# Patient Record
Sex: MALE | Race: WHITE | NOT HISPANIC OR LATINO | Employment: OTHER | URBAN - METROPOLITAN AREA
[De-identification: names, ages, dates, MRNs, and addresses within clinical notes are randomized per-mention and may not be internally consistent; named-entity substitution may affect disease eponyms.]

---

## 2017-08-01 ENCOUNTER — ALLSCRIPTS OFFICE VISIT (OUTPATIENT)
Dept: OTHER | Facility: OTHER | Age: 67
End: 2017-08-01

## 2017-08-01 DIAGNOSIS — E29.1 TESTICULAR HYPOFUNCTION: ICD-10-CM

## 2017-08-01 LAB
BILIRUB UR QL STRIP: NEGATIVE
CLARITY UR: NORMAL
COLOR UR: YELLOW
GLUCOSE (HISTORICAL): 100
HGB UR QL STRIP.AUTO: NEGATIVE
KETONES UR STRIP-MCNC: NEGATIVE MG/DL
LEUKOCYTE ESTERASE UR QL STRIP: NEGATIVE
NITRITE UR QL STRIP: NEGATIVE
PH UR STRIP.AUTO: 6 [PH]
PROT UR STRIP-MCNC: NEGATIVE MG/DL
SP GR UR STRIP.AUTO: 1.01
UROBILINOGEN UR QL STRIP.AUTO: 0.2

## 2017-08-09 ENCOUNTER — GENERIC CONVERSION - ENCOUNTER (OUTPATIENT)
Dept: OTHER | Facility: OTHER | Age: 67
End: 2017-08-09

## 2017-09-13 ENCOUNTER — APPOINTMENT (OUTPATIENT)
Dept: RADIOLOGY | Age: 67
End: 2017-09-13
Attending: PREVENTIVE MEDICINE
Payer: OTHER MISCELLANEOUS

## 2017-09-13 ENCOUNTER — TRANSCRIBE ORDERS (OUTPATIENT)
Dept: RADIOLOGY | Age: 67
End: 2017-09-13

## 2017-09-13 ENCOUNTER — APPOINTMENT (OUTPATIENT)
Dept: URGENT CARE | Age: 67
End: 2017-09-13
Payer: OTHER MISCELLANEOUS

## 2017-09-13 DIAGNOSIS — R07.81 RIB PAIN ON LEFT SIDE: ICD-10-CM

## 2017-09-13 DIAGNOSIS — M25.512 ACUTE PAIN OF LEFT SHOULDER: ICD-10-CM

## 2017-09-13 DIAGNOSIS — M25.512 ACUTE PAIN OF LEFT SHOULDER: Primary | ICD-10-CM

## 2017-09-13 PROCEDURE — 71101 X-RAY EXAM UNILAT RIBS/CHEST: CPT

## 2017-09-13 PROCEDURE — 73030 X-RAY EXAM OF SHOULDER: CPT

## 2017-09-13 PROCEDURE — 99283 EMERGENCY DEPT VISIT LOW MDM: CPT | Performed by: PREVENTIVE MEDICINE

## 2017-09-13 PROCEDURE — G0382 LEV 3 HOSP TYPE B ED VISIT: HCPCS | Performed by: PREVENTIVE MEDICINE

## 2017-09-16 ENCOUNTER — APPOINTMENT (OUTPATIENT)
Dept: URGENT CARE | Age: 67
End: 2017-09-16
Payer: OTHER MISCELLANEOUS

## 2017-09-16 PROCEDURE — 99213 OFFICE O/P EST LOW 20 MIN: CPT | Performed by: PREVENTIVE MEDICINE

## 2017-09-20 ENCOUNTER — APPOINTMENT (OUTPATIENT)
Dept: URGENT CARE | Age: 67
End: 2017-09-20
Payer: OTHER MISCELLANEOUS

## 2017-09-20 PROCEDURE — 99213 OFFICE O/P EST LOW 20 MIN: CPT | Performed by: PREVENTIVE MEDICINE

## 2017-09-27 ENCOUNTER — APPOINTMENT (OUTPATIENT)
Dept: RADIOLOGY | Age: 67
End: 2017-09-27
Payer: OTHER MISCELLANEOUS

## 2017-09-27 ENCOUNTER — TRANSCRIBE ORDERS (OUTPATIENT)
Dept: URGENT CARE | Age: 67
End: 2017-09-27

## 2017-09-27 ENCOUNTER — APPOINTMENT (OUTPATIENT)
Dept: URGENT CARE | Age: 67
End: 2017-09-27
Payer: OTHER MISCELLANEOUS

## 2017-09-27 DIAGNOSIS — T14.90XA INJURY: ICD-10-CM

## 2017-09-27 DIAGNOSIS — T14.90XA INJURY: Primary | ICD-10-CM

## 2017-09-27 PROCEDURE — 99213 OFFICE O/P EST LOW 20 MIN: CPT | Performed by: PREVENTIVE MEDICINE

## 2017-09-27 PROCEDURE — 73060 X-RAY EXAM OF HUMERUS: CPT

## 2018-01-12 VITALS
HEIGHT: 68 IN | WEIGHT: 276 LBS | SYSTOLIC BLOOD PRESSURE: 122 MMHG | DIASTOLIC BLOOD PRESSURE: 86 MMHG | BODY MASS INDEX: 41.83 KG/M2

## 2018-01-12 NOTE — MISCELLANEOUS
Message   Recorded as Task   Date: 08/09/2017 10:38 AM, Created By: Leyda Avery   Task Name: Call Back   Assigned To: Obie PEREIRA,TEAM   Regarding Patient: Brandy Bland, Status: In Progress   Comment:    Jennifer Mcmullen - 09 Aug 2017 10:38 AM     TASK CREATED  Caller: Self; Results Inquiry; [phone] (Home)  patient called for his lab results please call him back   Ce Hook - 09 Aug 2017 11:42 AM     TASK EDITED  [facility] for pt to call with name of lab to obtain results  Ce Hook - 09 Aug 2017 11:42 AM     TASK IN PROGRESS   Jacqueline Gupta - 09 Aug 2017 1:11 PM     TASK EDITED  Pt went to [facility] in [facility] 8/6/17 and had testostrone done  [address] Aug 2017 3:08 PM     TASK EDITED  Per Dr Andrews Most PSA normal range  Testosterone Panel also in normal range  ED due to other process  Pt  will make appt if he wants to discuss treatment  Active Problems    1  BPH without urinary obstruction (600 00) (N40 0)   2  Male erectile disorder (607 84) (N52 9)   3  Testicular hypogonadism (257 2) (E29 1)    Current Meds   1  Aspirin 81 MG TABS; Therapy: (Recorded:01Aug2017) to Recorded   2  Benazepril HCl - 5 MG Oral Tablet; Therapy: (Recorded:01Aug2017) to Recorded   3  Coreg 6 25 MG Oral Tablet (Carvedilol); Therapy: (Recorded:01Aug2017) to Recorded   4  Daily Multivitamin TABS; Therapy: (Recorded:01Aug2017) to Recorded   5  HumaLOG KwikPen 100 UNIT/ML Subcutaneous Solution Pen-injector; Therapy: (Recorded:01Aug2017) to Recorded   6  RA Loratadine 10 MG Oral Tablet; Therapy: (Recorded:01Aug2017) to Recorded   7  Requip 0 5 MG Oral Tablet (ROPINIRole HCl); Therapy: (Recorded:01Aug2017) to Recorded   8  Requip 2 MG Oral Tablet (ROPINIRole HCl); Therapy: (Recorded:01Aug2017) to Recorded   9  Sildenafil Citrate 20 MG Oral Tablet; TAKE 5 TABLET Other PRN erectile dysfuncition; Therapy: [id number] to (Evaluate:02Sep2017);  Last Rx:[id number] Ordered   10  Singulair 10 MG Oral Tablet (Montelukast Sodium); Therapy: (Recorded:41Vtc5565) to Recorded   11  Toujeo SoloStar 300 UNIT/ML Subcutaneous Solution Pen-injector; Therapy: (Recorded:95Pin6866) to Recorded    Allergies    1  Adhesive Tape TAPE   2  MetFORMIN HCl TABS   3  Silvadene CREA   4   Surgical TAPE    Signatures   Electronically signed by : Catrachita Montoya RN; Aug  9 2017  3:08PM EST                       (Author)

## 2018-03-06 DIAGNOSIS — N52.8 OTHER MALE ERECTILE DYSFUNCTION: Primary | ICD-10-CM

## 2018-03-06 RX ORDER — SILDENAFIL CITRATE 20 MG/1
TABLET ORAL
Qty: 90 TABLET | Refills: 1 | Status: SHIPPED | OUTPATIENT
Start: 2018-03-06

## 2018-03-06 NOTE — TELEPHONE ENCOUNTER
Patient called requesting refill on Sildenafil 20mg    Request for same, 90 count stock bottle with 1 refill was generated and forwarded to Dr Rosa Tolentino for approval

## 2018-07-31 ENCOUNTER — APPOINTMENT (OUTPATIENT)
Dept: LAB | Age: 68
End: 2018-07-31
Payer: MEDICARE

## 2018-07-31 DIAGNOSIS — E29.1 TESTICULAR HYPOFUNCTION: ICD-10-CM

## 2018-07-31 DIAGNOSIS — N40.0 ENLARGED PROSTATE WITHOUT LOWER URINARY TRACT SYMPTOMS (LUTS): ICD-10-CM

## 2018-07-31 LAB
LH SERPL-ACNC: 6.9 MIU/ML (ref 1.2–10.6)
PSA SERPL-MCNC: 1.4 NG/ML (ref 0–4)

## 2018-07-31 PROCEDURE — 84153 ASSAY OF PSA TOTAL: CPT

## 2018-07-31 PROCEDURE — 83002 ASSAY OF GONADOTROPIN (LH): CPT

## 2018-07-31 PROCEDURE — 84270 ASSAY OF SEX HORMONE GLOBUL: CPT

## 2018-07-31 PROCEDURE — 36415 COLL VENOUS BLD VENIPUNCTURE: CPT

## 2018-08-01 DIAGNOSIS — N40.0 ENLARGED PROSTATE WITHOUT LOWER URINARY TRACT SYMPTOMS (LUTS): ICD-10-CM

## 2018-08-01 LAB — SHBG SERPL-SCNC: 41.5 NMOL/L (ref 19.3–76.4)

## 2018-08-06 ENCOUNTER — OFFICE VISIT (OUTPATIENT)
Dept: UROLOGY | Facility: MEDICAL CENTER | Age: 68
End: 2018-08-06
Payer: MEDICARE

## 2018-08-06 VITALS
BODY MASS INDEX: 40.16 KG/M2 | DIASTOLIC BLOOD PRESSURE: 68 MMHG | SYSTOLIC BLOOD PRESSURE: 124 MMHG | WEIGHT: 265 LBS | HEIGHT: 68 IN

## 2018-08-06 DIAGNOSIS — R35.1 BPH ASSOCIATED WITH NOCTURIA: Primary | ICD-10-CM

## 2018-08-06 DIAGNOSIS — N40.1 BPH ASSOCIATED WITH NOCTURIA: Primary | ICD-10-CM

## 2018-08-06 LAB
SL AMB  POCT GLUCOSE, UA: NORMAL
SL AMB LEUKOCYTE ESTERASE,UA: NEGATIVE
SL AMB POCT BILIRUBIN,UA: NEGATIVE
SL AMB POCT BLOOD,UA: NEGATIVE
SL AMB POCT CLARITY,UA: CLEAR
SL AMB POCT COLOR,UA: YELLOW
SL AMB POCT KETONES,UA: NEGATIVE
SL AMB POCT NITRITE,UA: NEGATIVE
SL AMB POCT PH,UA: 5.5
SL AMB POCT SPECIFIC GRAVITY,UA: 1.01
SL AMB POCT URINE PROTEIN: NEGATIVE
SL AMB POCT UROBILINOGEN: 0.2

## 2018-08-06 PROCEDURE — 81003 URINALYSIS AUTO W/O SCOPE: CPT | Performed by: UROLOGY

## 2018-08-06 PROCEDURE — 99214 OFFICE O/P EST MOD 30 MIN: CPT | Performed by: UROLOGY

## 2018-08-06 RX ORDER — FLUTICASONE PROPIONATE 50 MCG
1 SPRAY, SUSPENSION (ML) NASAL AS NEEDED
COMMUNITY

## 2018-08-06 RX ORDER — HYDRALAZINE HYDROCHLORIDE 25 MG/1
TABLET, FILM COATED ORAL 2 TIMES DAILY
COMMUNITY
Start: 2018-06-15

## 2018-08-06 RX ORDER — ROPINIROLE 2 MG/1
TABLET, FILM COATED ORAL AS NEEDED
COMMUNITY

## 2018-08-06 RX ORDER — MONTELUKAST SODIUM 10 MG/1
TABLET ORAL DAILY
COMMUNITY

## 2018-08-06 RX ORDER — CARVEDILOL 25 MG/1
TABLET ORAL 2 TIMES DAILY
COMMUNITY
Start: 2018-05-21

## 2018-08-06 RX ORDER — ROPINIROLE 0.5 MG/1
TABLET, FILM COATED ORAL AS NEEDED
COMMUNITY

## 2018-08-06 RX ORDER — AMLODIPINE BESYLATE 10 MG/1
TABLET ORAL DAILY
COMMUNITY
Start: 2018-06-15

## 2018-08-06 RX ORDER — INSULIN GLARGINE 300 U/ML
INJECTION, SOLUTION SUBCUTANEOUS DAILY
COMMUNITY
Start: 2018-06-14

## 2018-08-06 RX ORDER — ALBUTEROL SULFATE 90 UG/1
1 AEROSOL, METERED RESPIRATORY (INHALATION) AS NEEDED
COMMUNITY

## 2018-08-06 RX ORDER — PREDNISONE 10 MG/1
TABLET ORAL DAILY
COMMUNITY
Start: 2018-06-29

## 2018-08-06 RX ORDER — CEPHALEXIN 500 MG/1
CAPSULE ORAL 4 TIMES DAILY
COMMUNITY
Start: 2018-08-03

## 2018-08-06 RX ORDER — HYDROXYCHLOROQUINE SULFATE 200 MG/1
TABLET, FILM COATED ORAL 2 TIMES DAILY
COMMUNITY
Start: 2018-05-21

## 2018-08-06 RX ORDER — LORATADINE 10 MG/1
10 TABLET ORAL DAILY
COMMUNITY

## 2018-08-06 RX ORDER — PREDNISONE 1 MG/1
TABLET ORAL DAILY
COMMUNITY
Start: 2018-07-16

## 2018-08-06 NOTE — PROGRESS NOTES
Assessment/Plan:    BPH associated with nocturia   The patient has mild symptoms  PSA is normal   Return in 1 year  Diagnoses and all orders for this visit:    BPH associated with nocturia  -     POCT urine dip auto non-scope  -     PSA, Total Screen; Future    Other orders  -     albuterol (PROVENTIL HFA,VENTOLIN HFA) 90 mcg/act inhaler; Inhale 1 puff as needed  -     amLODIPine (NORVASC) 10 mg tablet; daily  -     aspirin 81 MG tablet; Take by mouth daily  -     carvedilol (COREG) 25 mg tablet; 2 (two) times a day  -     cephalexin (KEFLEX) 500 mg capsule; 4 (four) times a day  -     Multiple Vitamins-Minerals (DAILY MULTIVITAMIN PO); Take by mouth daily  -     fluticasone (FLONASE) 50 mcg/act nasal spray; 1 spray into each nostril as needed  -     insulin lispro (HUMALOG KWIKPEN) 100 units/mL injection pen; Inject under the skin daily  -     hydrALAZINE (APRESOLINE) 25 mg tablet; 2 (two) times a day  -     hydroxychloroquine (PLAQUENIL) 200 mg tablet; 2 (two) times a day  -     TOUJEO SOLOSTAR 300 units/mL CONCETRATED U-300 injection pen; daily  -     loratadine (CLARITIN) 10 mg tablet; Take 10 mg by mouth daily  -     predniSONE 5 mg tablet; daily  -     predniSONE 10 mg tablet; daily  -     rOPINIRole (REQUIP) 0 5 mg tablet; Take by mouth as needed  -     rOPINIRole (REQUIP) 2 mg tablet; Take by mouth as needed  -     montelukast (SINGULAIR) 10 mg tablet; Take by mouth daily          Subjective:      Patient ID: Geni Dodson is a 78 y o  male  HPI  BPH:  Pt has to overhydrate due to 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  He notes nocturia x 3  He denies other significant urinary symptoms  He denies gross hematuria, urinary tract infections or incontinence  He is taking neither medications nor supplements for his symptoms  PSA last week  was 1 4  Erectile dysfunction:   Doing better in the last few months  Started prednisone in early April  Still using sildenafil 20 mg, 5 at a time  The following portions of the patient's history were reviewed and updated as appropriate: allergies, current medications, past family history, past medical history, past social history, past surgical history and problem list     Review of Systems   Constitutional: Negative for activity change and fatigue  Newly diagnosed sarcoidosis  As to hydrate to keep serum calcium low enough  Respiratory: Negative for shortness of breath and wheezing  Cardiovascular: Negative for chest pain  Gastrointestinal: Negative for abdominal pain  Endocrine:        IDDM with good control  Needs prednisone for sarcoidosis  Genitourinary: Negative for difficulty urinating, dysuria, frequency, hematuria and urgency  Creatinine was 1 85 in July  Musculoskeletal: Negative for back pain and gait problem  Skin: Negative  Allergic/Immunologic: Negative  Neurological: Negative  Psychiatric/Behavioral: Negative  Objective:      /68 (BP Location: Left arm, Patient Position: Sitting, Cuff Size: Standard)   Ht 5' 8" (1 727 m)   Wt 120 kg (265 lb)   BMI 40 29 kg/m²          Physical Exam   Constitutional: He is oriented to person, place, and time  He appears well-developed and well-nourished  HENT:   Head: Normocephalic and atraumatic  Eyes: EOM are normal    Neck: Normal range of motion  Neck supple  Pulmonary/Chest: Effort normal    Genitourinary: Rectum normal    Genitourinary Comments: The prostate is 50 g, smooth, non-tender  Musculoskeletal: Normal range of motion  Neurological: He is alert and oriented to person, place, and time  Skin: Skin is warm and dry  Psychiatric: He has a normal mood and affect   His behavior is normal  Judgment and thought content normal

## 2018-08-06 NOTE — PROGRESS NOTES
IPSS Questionnaire (AUA-7): Over the past month    1)  How often have you had a sensation of not emptying your bladder completely after you finish urinating? 1 - Less than 1 time in 5   2)  How often have you had to urinate again less than two hours after you finished urinating? 3 - About half the time   3)  How often have you found you stopped and started again several times when you urinated? 0 - Not at all   4) How difficult have you found it to postpone urination? 2 - Less than half the time   5) How often have you had a weak urinary stream?  1 - Less than 1 time in 5   6) How often have you had to push or strain to begin urination? 0 - Not at all   7) How many times did you most typically get up to urinate from the time you went to bed until the time you got up in the morning? 3 - 3 times   Total Score:  10     QOL: Mixed

## 2018-08-06 NOTE — LETTER
August 6, 2018     Slade Burgos,   [address]  [address] [facility]    Patient: Gaurang Reyes   YOB: 1950   Date of Visit: 8/6/2018       Dear Dr Ashley Anglin: Thank you for referring Ellie Eleuterios to me for evaluation  Below are my notes for this consultation  If you have questions, please do not hesitate to call me  I look forward to following your patient along with you  Sincerely,        Zelalem Henriquez MD        CC: No Recipients  Zelalem Henriquez MD  8/6/2018 12:01 PM  Sign at close encounter  Assessment/Plan:    BPH associated with nocturia   The patient has mild symptoms  PSA is normal   Return in 1 year  Diagnoses and all orders for this visit:    BPH associated with nocturia  -     POCT urine dip auto non-scope  -     PSA, Total Screen; Future    Other orders  -     albuterol (PROVENTIL HFA,VENTOLIN HFA) 90 mcg/act inhaler; Inhale 1 puff as needed  -     amLODIPine (NORVASC) 10 mg tablet; daily  -     aspirin 81 MG tablet; Take by mouth daily  -     carvedilol (COREG) 25 mg tablet; 2 (two) times a day  -     cephalexin (KEFLEX) 500 mg capsule; 4 (four) times a day  -     Multiple Vitamins-Minerals (DAILY MULTIVITAMIN PO); Take by mouth daily  -     fluticasone (FLONASE) 50 mcg/act nasal spray; 1 spray into each nostril as needed  -     insulin lispro (HUMALOG KWIKPEN) 100 units/mL injection pen; Inject under the skin daily  -     hydrALAZINE (APRESOLINE) 25 mg tablet; 2 (two) times a day  -     hydroxychloroquine (PLAQUENIL) 200 mg tablet; 2 (two) times a day  -     TOUJEO SOLOSTAR 300 units/mL CONCETRATED U-300 injection pen; daily  -     loratadine (CLARITIN) 10 mg tablet; Take 10 mg by mouth daily  -     predniSONE 5 mg tablet; daily  -     predniSONE 10 mg tablet; daily  -     rOPINIRole (REQUIP) 0 5 mg tablet; Take by mouth as needed  -     rOPINIRole (REQUIP) 2 mg tablet;  Take by mouth as needed  -     montelukast (SINGULAIR) 10 mg tablet; Take by mouth daily          Subjective:      Patient ID: Agatha Sutton is a 78 y o  male  HPI  BPH:  Pt has to overhydrate due to 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  He notes nocturia x 3  He denies other significant urinary symptoms  He denies gross hematuria, urinary tract infections or incontinence  He is taking neither medications nor supplements for his symptoms  PSA last week  was 1 4  Erectile dysfunction:   Doing better in the last few months  Started prednisone in early April  Still using sildenafil 20 mg, 5 at a time  The following portions of the patient's history were reviewed and updated as appropriate: allergies, current medications, past family history, past medical history, past social history, past surgical history and problem list     Review of Systems   Constitutional: Negative for activity change and fatigue  Newly diagnosed sarcoidosis  As to hydrate to keep serum calcium low enough  Respiratory: Negative for shortness of breath and wheezing  Cardiovascular: Negative for chest pain  Gastrointestinal: Negative for abdominal pain  Endocrine:        IDDM with good control  Needs prednisone for sarcoidosis  Genitourinary: Negative for difficulty urinating, dysuria, frequency, hematuria and urgency  Creatinine was 1 85 in July  Musculoskeletal: Negative for back pain and gait problem  Skin: Negative  Allergic/Immunologic: Negative  Neurological: Negative  Psychiatric/Behavioral: Negative  Objective:      /68 (BP Location: Left arm, Patient Position: Sitting, Cuff Size: Standard)   Ht 5' 8" (1 727 m)   Wt 120 kg (265 lb)   BMI 40 29 kg/m²           Physical Exam   Constitutional: He is oriented to person, place, and time  He appears well-developed and well-nourished  HENT:   Head: Normocephalic and atraumatic  Eyes: EOM are normal    Neck: Normal range of motion  Neck supple     Pulmonary/Chest: Effort normal    Genitourinary: Rectum normal    Genitourinary Comments: The prostate is 50 g, smooth, non-tender  Musculoskeletal: Normal range of motion  Neurological: He is alert and oriented to person, place, and time  Skin: Skin is warm and dry  Psychiatric: He has a normal mood and affect   His behavior is normal  Judgment and thought content normal

## 2018-08-30 PROCEDURE — 88305 TISSUE EXAM BY PATHOLOGIST: CPT | Performed by: PATHOLOGY

## 2018-08-31 ENCOUNTER — LAB REQUISITION (OUTPATIENT)
Dept: LAB | Facility: HOSPITAL | Age: 68
End: 2018-08-31
Payer: MEDICARE

## 2018-08-31 DIAGNOSIS — Z86.010 HISTORY OF COLONIC POLYPS: ICD-10-CM

## 2018-08-31 DIAGNOSIS — K58.9 IRRITABLE BOWEL SYNDROME WITHOUT DIARRHEA: ICD-10-CM

## 2018-08-31 DIAGNOSIS — D12.4 BENIGN NEOPLASM OF DESCENDING COLON: ICD-10-CM

## 2018-08-31 DIAGNOSIS — D12.2 BENIGN NEOPLASM OF ASCENDING COLON: ICD-10-CM

## 2018-08-31 DIAGNOSIS — K57.30 DIVERTICULOSIS OF LARGE INTESTINE WITHOUT PERFORATION OR ABSCESS WITHOUT BLEEDING: ICD-10-CM

## 2018-09-13 ENCOUNTER — TRANSCRIBE ORDERS (OUTPATIENT)
Dept: ADMINISTRATIVE | Facility: HOSPITAL | Age: 68
End: 2018-09-13

## 2018-09-13 DIAGNOSIS — H04.411 CHRONIC DACRYOCYSTITIS OF RIGHT LACRIMAL PASSAGE: ICD-10-CM

## 2018-09-13 DIAGNOSIS — H04.559: Primary | ICD-10-CM

## 2018-09-14 ENCOUNTER — HOSPITAL ENCOUNTER (OUTPATIENT)
Dept: RADIOLOGY | Facility: IMAGING CENTER | Age: 68
Discharge: HOME | End: 2018-09-14
Payer: MEDICARE

## 2018-09-14 DIAGNOSIS — H04.411 CHRONIC DACRYOCYSTITIS OF RIGHT LACRIMAL PASSAGE: ICD-10-CM

## 2018-09-14 DIAGNOSIS — H04.559: ICD-10-CM

## 2018-09-14 PROCEDURE — 70480 CT ORBIT/EAR/FOSSA W/O DYE: CPT

## 2019-08-06 ENCOUNTER — TELEPHONE (OUTPATIENT)
Dept: UROLOGY | Facility: MEDICAL CENTER | Age: 69
End: 2019-08-06

## 2019-08-06 NOTE — TELEPHONE ENCOUNTER
lmom advising pt that abx would not affect PSA unless he currently has a prostate or urinary tract infection

## 2019-08-06 NOTE — TELEPHONE ENCOUNTER
Patient of Dr Shelley Matthews seen in [facility]  Calling to advise he is currently on strong antibiotics for an infection on his foot and would like to know if this will affect his PSA      He can be reached at [phone]

## 2019-08-07 DIAGNOSIS — N40.1 BPH ASSOCIATED WITH NOCTURIA: Primary | ICD-10-CM

## 2019-08-07 DIAGNOSIS — R35.1 BPH ASSOCIATED WITH NOCTURIA: Primary | ICD-10-CM

## 2019-08-09 RX ORDER — BENAZEPRIL HYDROCHLORIDE 5 MG/1
5 TABLET, FILM COATED ORAL DAILY
COMMUNITY
Start: 2018-09-25 | End: 2021-11-24

## 2019-08-09 RX ORDER — CEFADROXIL 500 MG/1
500 CAPSULE ORAL 2 TIMES DAILY
Refills: 0 | COMMUNITY
Start: 2019-07-08

## 2019-08-09 RX ORDER — BLOOD SUGAR DIAGNOSTIC
STRIP MISCELLANEOUS
Refills: 11 | COMMUNITY
Start: 2019-05-24

## 2019-08-09 RX ORDER — DOXYCYCLINE 100 MG/1
100 TABLET ORAL 2 TIMES DAILY
COMMUNITY
Start: 2019-08-05 | End: 2019-08-15

## 2019-08-09 RX ORDER — OXYCODONE HYDROCHLORIDE 5 MG/1
TABLET ORAL
Refills: 0 | COMMUNITY
Start: 2019-08-05

## 2021-11-23 ENCOUNTER — TELEPHONE (OUTPATIENT)
Dept: GASTROENTEROLOGY | Facility: HOSPITAL | Age: 71
End: 2021-11-23

## 2021-11-24 ENCOUNTER — HOSPITAL ENCOUNTER (OUTPATIENT)
Dept: GASTROENTEROLOGY | Facility: HOSPITAL | Age: 71
Setting detail: OUTPATIENT SURGERY
Discharge: HOME | End: 2021-11-24
Attending: COLON & RECTAL SURGERY
Payer: MEDICARE

## 2021-11-24 ENCOUNTER — ANESTHESIA EVENT (OUTPATIENT)
Dept: GASTROENTEROLOGY | Facility: HOSPITAL | Age: 71
End: 2021-11-24

## 2021-11-24 ENCOUNTER — ANESTHESIA (OUTPATIENT)
Dept: GASTROENTEROLOGY | Facility: HOSPITAL | Age: 71
End: 2021-11-24

## 2021-11-24 VITALS
SYSTOLIC BLOOD PRESSURE: 112 MMHG | TEMPERATURE: 97.6 F | RESPIRATION RATE: 20 BRPM | OXYGEN SATURATION: 95 % | DIASTOLIC BLOOD PRESSURE: 65 MMHG | HEART RATE: 78 BPM

## 2021-11-24 DIAGNOSIS — Z86.010 PERSONAL HISTORY OF COLONIC POLYPS: ICD-10-CM

## 2021-11-24 PROCEDURE — 88305 TISSUE EXAM BY PATHOLOGIST: CPT | Performed by: PATHOLOGY

## 2021-11-24 PROCEDURE — 99213 OFFICE O/P EST LOW 20 MIN: CPT | Performed by: COLON & RECTAL SURGERY

## 2021-11-24 PROCEDURE — 45385 COLONOSCOPY W/LESION REMOVAL: CPT | Performed by: COLON & RECTAL SURGERY

## 2021-11-24 RX ORDER — FENTANYL CITRATE/PF 50 MCG/ML
25 SYRINGE (ML) INJECTION
Status: CANCELLED | OUTPATIENT
Start: 2021-11-24

## 2021-11-24 RX ORDER — MEPERIDINE HYDROCHLORIDE 25 MG/ML
12.5 INJECTION INTRAMUSCULAR; INTRAVENOUS; SUBCUTANEOUS
Status: CANCELLED | OUTPATIENT
Start: 2021-11-24

## 2021-11-24 RX ORDER — GLYCOPYRROLATE 0.2 MG/ML
INJECTION INTRAMUSCULAR; INTRAVENOUS AS NEEDED
Status: DISCONTINUED | OUTPATIENT
Start: 2021-11-24 | End: 2021-11-24

## 2021-11-24 RX ORDER — EPHEDRINE SULFATE 50 MG/ML
INJECTION INTRAVENOUS AS NEEDED
Status: DISCONTINUED | OUTPATIENT
Start: 2021-11-24 | End: 2021-11-24

## 2021-11-24 RX ORDER — LIDOCAINE HYDROCHLORIDE 20 MG/ML
INJECTION, SOLUTION EPIDURAL; INFILTRATION; INTRACAUDAL; PERINEURAL AS NEEDED
Status: DISCONTINUED | OUTPATIENT
Start: 2021-11-24 | End: 2021-11-24

## 2021-11-24 RX ORDER — PROMETHAZINE HYDROCHLORIDE 25 MG/ML
12.5 INJECTION, SOLUTION INTRAMUSCULAR; INTRAVENOUS ONCE AS NEEDED
Status: CANCELLED | OUTPATIENT
Start: 2021-11-24

## 2021-11-24 RX ORDER — PROPOFOL 10 MG/ML
INJECTION, EMULSION INTRAVENOUS AS NEEDED
Status: DISCONTINUED | OUTPATIENT
Start: 2021-11-24 | End: 2021-11-24

## 2021-11-24 RX ORDER — PROPOFOL 10 MG/ML
INJECTION, EMULSION INTRAVENOUS CONTINUOUS PRN
Status: DISCONTINUED | OUTPATIENT
Start: 2021-11-24 | End: 2021-11-24

## 2021-11-24 RX ORDER — ONDANSETRON 2 MG/ML
4 INJECTION INTRAMUSCULAR; INTRAVENOUS ONCE AS NEEDED
Status: CANCELLED | OUTPATIENT
Start: 2021-11-24

## 2021-11-24 RX ORDER — SODIUM CHLORIDE 9 MG/ML
INJECTION, SOLUTION INTRAVENOUS CONTINUOUS PRN
Status: DISCONTINUED | OUTPATIENT
Start: 2021-11-24 | End: 2021-11-24

## 2021-11-24 RX ORDER — HYDROMORPHONE HCL/PF 1 MG/ML
0.5 SYRINGE (ML) INJECTION
Status: CANCELLED | OUTPATIENT
Start: 2021-11-24

## 2021-11-24 RX ORDER — ALBUTEROL SULFATE 2.5 MG/3ML
2.5 SOLUTION RESPIRATORY (INHALATION) ONCE AS NEEDED
Status: CANCELLED | OUTPATIENT
Start: 2021-11-24

## 2021-11-24 RX ADMIN — PROPOFOL 100 MG: 10 INJECTION, EMULSION INTRAVENOUS at 09:27

## 2021-11-24 RX ADMIN — EPHEDRINE SULFATE 10 MG: 50 INJECTION, SOLUTION INTRAVENOUS at 09:37

## 2021-11-24 RX ADMIN — EPHEDRINE SULFATE 10 MG: 50 INJECTION, SOLUTION INTRAVENOUS at 09:41

## 2021-11-24 RX ADMIN — EPHEDRINE SULFATE 10 MG: 50 INJECTION, SOLUTION INTRAVENOUS at 09:51

## 2021-11-24 RX ADMIN — PROPOFOL 40 MG: 10 INJECTION, EMULSION INTRAVENOUS at 09:48

## 2021-11-24 RX ADMIN — PROPOFOL 40 MG: 10 INJECTION, EMULSION INTRAVENOUS at 09:32

## 2021-11-24 RX ADMIN — GLYCOPYRROLATE 0.2 MG: 0.2 INJECTION, SOLUTION INTRAMUSCULAR; INTRAVENOUS at 09:30

## 2021-11-24 RX ADMIN — PROPOFOL 140 MCG/KG/MIN: 10 INJECTION, EMULSION INTRAVENOUS at 09:32

## 2021-11-24 RX ADMIN — LIDOCAINE HYDROCHLORIDE 50 MG: 20 INJECTION, SOLUTION EPIDURAL; INFILTRATION; INTRACAUDAL; PERINEURAL at 09:27

## 2021-11-24 RX ADMIN — SODIUM CHLORIDE: 0.9 INJECTION, SOLUTION INTRAVENOUS at 08:51

## 2021-11-30 ENCOUNTER — TELEPHONE (OUTPATIENT)
Dept: GENETICS | Facility: CLINIC | Age: 71
End: 2021-11-30

## 2022-06-03 ENCOUNTER — TELEPHONE (OUTPATIENT)
Dept: SURGICAL ONCOLOGY | Facility: CLINIC | Age: 72
End: 2022-06-03

## 2022-06-08 ENCOUNTER — DOCUMENTATION (OUTPATIENT)
Dept: GENETICS | Facility: CLINIC | Age: 72
End: 2022-06-08

## 2022-06-08 ENCOUNTER — CLINICAL SUPPORT (OUTPATIENT)
Dept: GENETICS | Facility: CLINIC | Age: 72
End: 2022-06-08
Payer: MEDICARE

## 2022-06-08 DIAGNOSIS — Z80.42 FAMILY HISTORY OF PROSTATE CANCER: ICD-10-CM

## 2022-06-08 DIAGNOSIS — Z86.010 PERSONAL HISTORY OF COLONIC POLYPS: Primary | ICD-10-CM

## 2022-06-08 PROCEDURE — NC001 PR NO CHARGE: Performed by: GENETIC COUNSELOR, MS

## 2022-06-08 PROCEDURE — 36415 COLL VENOUS BLD VENIPUNCTURE: CPT

## 2022-06-08 NOTE — LETTER
2022     Gwendolyn Peters MD  [address] [facility]    Patient: Jaron Vu  YOB: 1950  Date of Visit: 2022      Dear Dr Macrie Thompson: Thank you for referring Eliza Zamora to me for evaluation  Below are my notes for this consultation  If you have questions, please do not hesitate to call me  I look forward to following your patient along with you  Sincerely,        Priscila Juarez GC        CC: No Recipients        Pre-Test Genetic Counseling Consult Note    Patient Name: Jaron Vu   /Age: 1950/71 y o  Referring Provider: Gwendolyn Peters MD    Date of Service: 2022  Genetic Counselor: Emerita Stahl MS, [facility]  Interpretation Services: None  [facility]      Yulia Elizabeth was referred to the [address] [place] [address] and Genetic Assessment Program due to his personal history of colon cancer  He presents today to discuss the possibility of a hereditary cancer syndrome, options for genetic testing, and implications for him and his family  Cancer History and Treatment:     Personal History: No personal history of cancer but has a history of at least 10 adenomatous colon polyps since 2018  There is a note on the 2018 report indicating a prior history of colon polyps  Yulia Elizabeth reports having at least 20 polyps in total       Screening Hx:     Colon:  Colonoscopy: Most recent 2021 with repeat in 1 year    2021  Final Diagnosis   A  Polyp, Colorectal, descending colon polyp x2 - cold snare:  -Tubular adenomas x2  -No evidence of high grade dysplasia or malignancy seen       B  Polyp, Colorectal, transverse colon polyp x2 - cold snare:  - Sessile serrated adenoma with surface ulceration  -Tubular adenoma  -No evidence of high grade dysplasia or malignancy seen       C   Polyp, Colorectal, ascending colon polyp x2 - cold snare:  - Sessile serrated adenoma  - No evidence of malignancy       D  Polyp, Colorectal, splenic flexure polyp - cold snare:  -Fragments of tubular adenoma   -No evidence of high grade dysplasia or malignancy          Electronically signed by Elif Guallpa MD on 12/1/2021 at  9:07 AM     8/30/2018  Final Diagnosis   A  Ascending colon polyp, endoscopic polypectomy:  - Colonic mucosa with features of tubular adenoma  - No high grade dysplasia and no evidence of malignancy      B  Descending colon polyp x 2, endoscopic polypectomy:  - Fragments of tubular adenoma  - No high grade dysplasia and no evidence of malignancy  Electronically signed by Dionte Santiago MD on 9/4/2018 at  3:35 PM     Medical and Surgical History  Pertinent surgical history:   Past Surgical History:   Procedure Laterality Date   [address] 1986, 2014   [address]  2006    LYMPH NODE BIOPSY  03/2018      Pertinent medical history:  Past Medical History:   Diagnosis Date    Arthritis     Asthma     BPH with obstruction/lower urinary tract symptoms     Diabetes mellitus ([facility] [address] )     Feeling of incomplete bladder emptying     Foot drop, left     Hearing loss     History of back injury     Microhematuria     Nocturia     Sarcoidosis     Type 2 diabetes mellitus (HCC)     Urinary frequency     Urinary urgency     Weak urinary stream        Other History:  Height:   Ht Readings from Last 1 Encounters:   08/06/18 5' 8" (1 727 m)     Weight:   Wt Readings from Last 1 Encounters:   08/06/18 120 kg (265 lb)     Relevant Family History   Patient reports no Ashkenazi Sabianist ancestry       Mother (d age 58) with a history of lung cancer; she also had a history of tobacco use  Ruma Purdy has no information on maternal relatives    Father (d age 80) with CLL  Paternal Uncle (d age 78) with prostate cancer at age 66  Paternal Grandfather (d age 62) with lung cancer; he had a history of tobacco use      Please refer to the scanned pedigree in the Media Tab for a complete family history     *All history is reported as provided by the patient; records are not available for review, except where indicated  Assessment:  We discussed sporadic, familial and hereditary cancer  We also discussed the many factors that influence our risk for cancer such as age, environmental exposures, lifestyle choices and family history  We reviewed the indications suggestive of a hereditary predisposition to cancer  Genetic testing is indicated for Samir Trinidad based on the following criteria: Meets NCCN V2 2021 Testing Criteria for Adenomatous Polyposis: Personal history of 10+ adenomatous colon polyps    The risks, benefits, and limitations of genetic testing were reviewed with the patient, as well as genetic discrimination laws, and possible test results (positive, negative, variants of uncertain significance) and their clinical implications  If positive for a mutation, options for managing cancer risk including increased surveillance, chemoprevention, and in some cases prophylactic surgery were discussed  Samir Trinidad was informed that if a hereditary cancer syndrome was identified in him, first degree relatives (parents, siblings, and children) have a chance of also inheriting the condition  Genetic testing would allow for predictive genetic testing in other relatives, who may also be at risk depending on their degree of relation  Plan: Patient decided to proceed with testing and provided consent      Summary:     Sample Collection:  The patient's blood sample was drawn in the office on 6/8/22 by medical assistant Colleen West    Genetic Testing Preformed: [facility] Common Hereditary Cancers Panel + RNA (47 genes): APC, MATILDE, AXIN2, BARD1, BMPR1A, BRCA1, BRCA2, BRIP1, CDH1, CDK4, CDKN2A, CHEK2, CTNNA1, DICER1, EPCAM, GREM1, HOXB13, KIT, MEN1, MLH1, MSH2, MSH3, MSH6, MUTYH, NBN, NF1, NTHL1, PALB2, PDGFRA, PMS2, POLD1, POLE, PTEN, RAD50, RAD51C, RAD51D, SDHA, SDHB, SDHC, SDHD, SMAD4, SMARCA4, STK11, TP53, TSC1, TSC2, VHL    Results take approximately 2-3 weeks to complete once test is started  We will contact Bobbi Saleem once results are available  Additional recommendations for surveillance/medical management will be made pending genetic test results

## 2022-06-08 NOTE — PROGRESS NOTES
Pre-Test Genetic Counseling Consult Note    Patient Name: Ute Lopez   /Age: 1950/71 y o  Referring Provider: Suleiman Pichardo MD    Date of Service: 2022  Genetic Counselor: Carmen Quezada MS, [facility]  Interpretation Services: None  [facility]      Flor Anderson was referred to the [address] [place] [address] and Genetic Assessment Program due to his personal history of colon cancer  He presents today to discuss the possibility of a hereditary cancer syndrome, options for genetic testing, and implications for him and his family  Cancer History and Treatment:     Personal History: No personal history of cancer but has a history of at least 10 adenomatous colon polyps since 2018  There is a note on the 2018 report indicating a prior history of colon polyps  Flor Anderson reports having at least 20 polyps in total       Screening Hx:     Colon:  Colonoscopy: Most recent 2021 with repeat in 1 year    2021  Final Diagnosis   A  Polyp, Colorectal, descending colon polyp x2 - cold snare:  -Tubular adenomas x2  -No evidence of high grade dysplasia or malignancy seen       B  Polyp, Colorectal, transverse colon polyp x2 - cold snare:  - Sessile serrated adenoma with surface ulceration  -Tubular adenoma  -No evidence of high grade dysplasia or malignancy seen       C  Polyp, Colorectal, ascending colon polyp x2 - cold snare:  - Sessile serrated adenoma  - No evidence of malignancy       D  Polyp, Colorectal, splenic flexure polyp - cold snare:  -Fragments of tubular adenoma   -No evidence of high grade dysplasia or malignancy          Electronically signed by Ry Williamson MD on 2021 at  9:07 AM     2018  Final Diagnosis   A  Ascending colon polyp, endoscopic polypectomy:  - Colonic mucosa with features of tubular adenoma  - No high grade dysplasia and no evidence of malignancy      B   Descending colon polyp x 2, endoscopic polypectomy:  - Fragments of tubular adenoma  - No high grade dysplasia and no evidence of malignancy  Electronically signed by Lita Everett MD on 9/4/2018 at  3:35 PM     Medical and Surgical History  Pertinent surgical history:   Past Surgical History:   Procedure Laterality Date   [address] 1986, 2014   [address]  2006    LYMPH NODE BIOPSY  03/2018      Pertinent medical history:  Past Medical History:   Diagnosis Date    Arthritis     Asthma     BPH with obstruction/lower urinary tract symptoms     Diabetes mellitus ([address] )     Feeling of incomplete bladder emptying     Foot drop, left     Hearing loss     History of back injury     Microhematuria     Nocturia     Sarcoidosis     Type 2 diabetes mellitus (HCC)     Urinary frequency     Urinary urgency     Weak urinary stream        Other History:  Height:   Ht Readings from Last 1 Encounters:   08/06/18 5' 8" (1 727 m)     Weight:   Wt Readings from Last 1 Encounters:   08/06/18 120 kg (265 lb)     Relevant Family History   Patient reports no Ashkenazi Gnosticist ancestry  Mother (d age 58) with a history of lung cancer; she also had a history of tobacco use  Russ Boys has no information on maternal relatives    Father (d age 80) with CLL  Paternal Uncle (d age 78) with prostate cancer at age 66  Paternal Grandfather (d age 62) with lung cancer; he had a history of tobacco use      Please refer to the scanned pedigree in the Media Tab for a complete family history     *All history is reported as provided by the patient; records are not available for review, except where indicated  Assessment:  We discussed sporadic, familial and hereditary cancer  We also discussed the many factors that influence our risk for cancer such as age, environmental exposures, lifestyle choices and family history  We reviewed the indications suggestive of a hereditary predisposition to cancer      Genetic testing is indicated for Nikki Headley based on the following criteria: Meets NCCN V2 2021 Testing Criteria for Adenomatous Polyposis: Personal history of 10+ adenomatous colon polyps    The risks, benefits, and limitations of genetic testing were reviewed with the patient, as well as genetic discrimination laws, and possible test results (positive, negative, variants of uncertain significance) and their clinical implications  If positive for a mutation, options for managing cancer risk including increased surveillance, chemoprevention, and in some cases prophylactic surgery were discussed  Nikki Headley was informed that if a hereditary cancer syndrome was identified in him, first degree relatives (parents, siblings, and children) have a chance of also inheriting the condition  Genetic testing would allow for predictive genetic testing in other relatives, who may also be at risk depending on their degree of relation  Plan: Patient decided to proceed with testing and provided consent  Summary:     Sample Collection:  The patient's blood sample was drawn in the office on 6/8/22 by medical assistant Joelle Goldberg    Genetic Testing Preformed: [facility] Common Hereditary Cancers Panel + RNA (52 genes): APC, MATILDE, AXIN2, BARD1, BMPR1A, BRCA1, BRCA2, BRIP1, CDH1, CDK4, CDKN2A, CHEK2, CTNNA1, DICER1, EPCAM, GREM1, HOXB13, KIT, MEN1, MLH1, MSH2, MSH3, MSH6, MUTYH, NBN, NF1, NTHL1, PALB2, PDGFRA, PMS2, POLD1, POLE, PTEN, RAD50, RAD51C, RAD51D, SDHA, SDHB, SDHC, SDHD, SMAD4, SMARCA4, STK11, TP53, TSC1, TSC2, VHL    Results take approximately 2-3 weeks to complete once test is started  We will contact Nikki Kayode once results are available  Additional recommendations for surveillance/medical management will be made pending genetic test results

## 2022-06-13 LAB — MISCELLANEOUS LAB TEST RESULT: NORMAL

## 2022-06-28 ENCOUNTER — TELEPHONE (OUTPATIENT)
Dept: GENETICS | Facility: CLINIC | Age: 72
End: 2022-06-28

## 2022-06-28 NOTE — TELEPHONE ENCOUNTER
Post-Test Genetic Counseling Consult Note     Today I spoke with Flor Anderson over the phone to review the results of his genetic test for hereditary cancer  He met previously with Carmen Quezada on 6/8 for pre-test counseling  A copy of this consult note and genetic test result will be shared with the patient  SUMMARY:    Test(s): webtide Common Hereditary Cancers Panel +RNA (47 genes): APC, MATILDE, AXIN2, BARD1, BMPR1A, BRCA1, BRCA2, BRIP1, CDH1, CDK4, CDKN2A, CHEK2, CTNNA1, DICER1, EPCAM, GREM1, HOXB13, KIT, MEN1, MLH1, MSH2, MSH3, MSH6, MUTYH, NBN, NF1, NTHL1, PALB2, PDGFRA, PMS2, POLD1, POLE, PTEN, RAD50, RAD51C, RAD51D, SDHA, SDHB, SDHC, SDHD, SMAD4, SMARCA4, STK11, TP53, TSC1, TSC2, VHL      Result: 2 Variants of uncertain significance     Variant 1  MATILDE c 7235A>C (p [id number]); heterozygous; uncertain significance     Variant 2  MSH6 c 2983G>A (p [id number]); heterozygous; uncertain significance     Assessment: A variant of uncertain significance (VUS) means that a change was identified in a specific gene but it cannot be determined whether the variant is associated with an increased risk of cancer or is a harmless genetic change  It is possible that the variant was seen in only a handful of individuals, or there may be conflicting or incomplete information in the medical literature about the variant and its association with hereditary cancer  The significance of the MATILDE and MSH6 variants is currently not known and therefore this test result cannot be used to help determine Flor Anderson cancer risks  This result reduces the likelihood that Flor Anderson has a hereditary polyposis condition  However, this testing is unable to completely rule it out    It remains possible that:  - There is a variant in an area of a gene which was not tested or there is a variant not detectable due to technical limitations of this test      - There is a variant in another gene that was not included in this test or in a gene not known to be linked to cancer or tumors  - There is somatic mosaicism for an APC mutation in which sequencing of the APC gene in DNA extracted from peripheral blood lymphocytes may fail to detect pathogenic variants because of weak mutation signals  Somatic mosacism can occur in up to 20% of individuals who have unaffected parents  [facility]s personal history he meets the clinical definition for 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)  The NCCN Genetic/Familial High Risk Assessment:Colorectal Guideline V2 2021 has surveillance/management recommendations for individuals with 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)  Colonic Adenomatous Polyposis of Unknown Etiology is defined as individuals with 10 or more adenomas without a pathogenic variant identified in a polyposis gene  Surveillance recommendations for first-degree relatives (parents, siblings & children) of individuals with 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): If an individual has 10-19 adenomas and a negative genetic test result, surveillance for their first-degree relatives should be managed based on clinical judgement  Frequency of surveillance may be modified based on personal, cumulative history of adenomas     Risks and Testing for Family Members:    Genetic testing for these variants is not recommended for relatives who wish to determine their cancer risks for purposes of determining medical management  The presence or absence of these variants in a relative is not clinically meaningful unless the variant is reclassified in the future  The laboratory will continue to accumulate information on these variants and will reclassify them as either a positive or negative genetic test result when they are confident that they have adequate information  As updated information is obtained, we will notify [facility]   It is important to note that the majority of variants of uncertain significance are reclassified as likely benign or benign as additional information about the variant becomes available  Despite this result, Jason's first-degree relatives may be at increased risk for the cancers based on the family history  We recommend they discuss screening and management recommendations with their healthcare providers  If Kristen Virgen has any affected family members with a cancer diagnosis, especially at a young age, they may still consider genetic testing  Relatives who wish to pursue genetic testing can reach out to the [facility] [address] ([id number]) to schedule an appointment or visit www [facility] org to identify a local genetic counselor  Plan:   Recommendations for Kristen Virgen are outlined below however the surveillance and medical management should continue as clinically indicated and as determined appropriate by his healthcare providers  Colon Cancer Screening:   Manage based on NCCN Genetic/Familial High Risk Assessment:Colorectal Guideline V1 2022 surveillance/management recommendations for individuals with 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):    Personal history of 10-19 adenomas   - Manage based on clinical judgement     -Frequency of surveillance may be modified based on factors such as age at which patient met cumulative adenoma threshold or total number of adenomas at most recent colonoscopy, with more frequent surveillance favored for younger age at meeting threshold or higher adenoma burden at last colonoscopy    Other Cancer Screening Recommendations: There are no additional recommendations based on Jason's negative result  he should continue cancer screening and medical management as clinically indicated and as determined appropriate by his healthcare providers      VUS Result: Kristen Virgen was strongly encouraged to contact us regarding any changes in his personal or family history of cancer as these changes could alter our recommendation regarding genetic testing and/or cancer screening  Nupur Patino was also encouraged to follow up with us on an annual basis as variant classifications are subject to change

## 2022-06-29 ENCOUNTER — TELEPHONE (OUTPATIENT)
Dept: GENETICS | Facility: CLINIC | Age: 72
End: 2022-06-29

## 2022-06-29 NOTE — TELEPHONE ENCOUNTER
----- Message from Myranda Ding sent at 6/28/2022  4:22 PM EDT -----  Regarding: complete chart  GC Completed Chart     Result Type: VUS    Result Delivery: Mail    Monthly Review: Does not need monthly review- COMPLETE

## 2022-11-30 ENCOUNTER — HOSPITAL ENCOUNTER (OUTPATIENT)
Dept: GASTROENTEROLOGY | Facility: HOSPITAL | Age: 72
Setting detail: OUTPATIENT SURGERY
Discharge: HOME | End: 2022-11-30
Attending: COLON & RECTAL SURGERY

## 2022-11-30 ENCOUNTER — ANESTHESIA (OUTPATIENT)
Dept: GASTROENTEROLOGY | Facility: HOSPITAL | Age: 72
End: 2022-11-30

## 2022-11-30 ENCOUNTER — ANESTHESIA EVENT (OUTPATIENT)
Dept: GASTROENTEROLOGY | Facility: HOSPITAL | Age: 72
End: 2022-11-30

## 2022-11-30 VITALS
SYSTOLIC BLOOD PRESSURE: 136 MMHG | DIASTOLIC BLOOD PRESSURE: 68 MMHG | HEART RATE: 65 BPM | BODY MASS INDEX: 43.95 KG/M2 | HEIGHT: 68 IN | OXYGEN SATURATION: 95 % | TEMPERATURE: 97.3 F | RESPIRATION RATE: 18 BRPM | WEIGHT: 290 LBS

## 2022-11-30 DIAGNOSIS — Z86.010 PERSONAL HISTORY OF COLONIC POLYPS: ICD-10-CM

## 2022-11-30 DIAGNOSIS — K57.90 DIVERTICULOSIS: ICD-10-CM

## 2022-11-30 PROBLEM — N18.32 STAGE 3B CHRONIC KIDNEY DISEASE (HCC): Status: ACTIVE | Noted: 2019-09-09

## 2022-11-30 PROBLEM — N40.0 BPH WITHOUT URINARY OBSTRUCTION: Status: ACTIVE | Noted: 2017-08-01

## 2022-11-30 RX ORDER — PROPOFOL 10 MG/ML
INJECTION, EMULSION INTRAVENOUS AS NEEDED
Status: DISCONTINUED | OUTPATIENT
Start: 2022-11-30 | End: 2022-11-30

## 2022-11-30 RX ORDER — SODIUM CHLORIDE 9 MG/ML
INJECTION, SOLUTION INTRAVENOUS CONTINUOUS PRN
Status: DISCONTINUED | OUTPATIENT
Start: 2022-11-30 | End: 2022-11-30

## 2022-11-30 RX ORDER — LIDOCAINE HYDROCHLORIDE 10 MG/ML
0.5 INJECTION, SOLUTION EPIDURAL; INFILTRATION; INTRACAUDAL; PERINEURAL ONCE AS NEEDED
Status: CANCELLED | OUTPATIENT
Start: 2022-11-30

## 2022-11-30 RX ORDER — SODIUM CHLORIDE, SODIUM LACTATE, POTASSIUM CHLORIDE, CALCIUM CHLORIDE 600; 310; 30; 20 MG/100ML; MG/100ML; MG/100ML; MG/100ML
50 INJECTION, SOLUTION INTRAVENOUS CONTINUOUS
Status: CANCELLED | OUTPATIENT
Start: 2022-11-30

## 2022-11-30 RX ORDER — LIDOCAINE HYDROCHLORIDE 10 MG/ML
INJECTION, SOLUTION EPIDURAL; INFILTRATION; INTRACAUDAL; PERINEURAL AS NEEDED
Status: DISCONTINUED | OUTPATIENT
Start: 2022-11-30 | End: 2022-11-30

## 2022-11-30 RX ADMIN — PROPOFOL 30 MG: 10 INJECTION, EMULSION INTRAVENOUS at 10:39

## 2022-11-30 RX ADMIN — LIDOCAINE HYDROCHLORIDE 50 MG: 10 INJECTION, SOLUTION EPIDURAL; INFILTRATION; INTRACAUDAL at 10:34

## 2022-11-30 RX ADMIN — PROPOFOL 40 MG: 10 INJECTION, EMULSION INTRAVENOUS at 10:44

## 2022-11-30 RX ADMIN — PROPOFOL 30 MG: 10 INJECTION, EMULSION INTRAVENOUS at 10:41

## 2022-11-30 RX ADMIN — PROPOFOL 20 MG: 10 INJECTION, EMULSION INTRAVENOUS at 10:50

## 2022-11-30 RX ADMIN — SODIUM CHLORIDE: 0.9 INJECTION, SOLUTION INTRAVENOUS at 10:31

## 2022-11-30 RX ADMIN — PROPOFOL 100 MG: 10 INJECTION, EMULSION INTRAVENOUS at 10:34

## 2022-11-30 NOTE — ANESTHESIA PREPROCEDURE EVALUATION
Procedure:  COLONOSCOPY    Relevant Problems   CARDIO   (+) Benign essential hypertension      ENDO   (+) Type 2 diabetes mellitus (HCC)      /RENAL   (+) BPH without urinary obstruction   (+) Stage 3b chronic kidney disease (HCC)      PULMONARY   (+) Asthma   (+) OBBO (obstructive sleep apnea)   Non-compliant with CPAP    Hx of myocarditis with EF reportedly recovered    Confirmed NPO appropriate    Physical Exam    Airway    Mallampati score: III         Dental   lower dentures and upper dentures,     Cardiovascular      Pulmonary      Other Findings        2015 TTE:   Left ventricle is mildly dilated     Moderately reduced left ventricular systolic function     Global hypokinesis     Mild to moderate concentric left ventricular hypertrophy     Estimated left ventricular ejection fraction is 35-40%     Normal right ventricular size and function     No significant valve abnormalities       The pulmonary artery pressure could not be calculated due to an      incomplete tricuspid regurgitant gradient measurement     Diastolic dysfunction with indeterminate filling pressures  Anesthesia Plan  ASA Score- 3     Anesthesia Type- IV sedation with anesthesia with ASA Monitors  Additional Monitors:   Airway Plan:     Comment: I discussed the risks and benefits of IV sedation anesthesia including the possibility of the need to convert to general anesthesia and the potential risk of awareness  Plan Factors-Exercise tolerance (METS): >4 METS  Exercise comment: Able to climb flight of stairs without cardiopulmonary limitation  Chart reviewed  EKG reviewed  Existing labs reviewed  Patient summary reviewed  Induction- intravenous  Postoperative Plan-     Informed Consent- Anesthetic plan and risks discussed with patient

## 2022-11-30 NOTE — ANESTHESIA POSTPROCEDURE EVALUATION
Post-Op Assessment Note    CV Status:  Stable  Pain Score: 0    Pain management: adequate     Mental Status:  Awake   Hydration Status:  Euvolemic   PONV Controlled:  Controlled   Airway Patency:  Patent      Post Op Vitals Reviewed: Yes      Staff: CRNA, Anesthesiologist         No notable events documented      BP   115/57   Temp  97 3   Pulse  70   Resp   20   SpO2   92%

## 2022-11-30 NOTE — H&P
History and Physical   Colon and Rectal Surgery   Job Viviana 71 y o  male MRN: [id number]  Unit/Bed#:  Encounter: [id number]  11/30/22   10:30 AM      CC:  Numerous colon polyps    History of Present Illness   HPI:  Job Viviana is a 71 y o  male with no GI symptoms      Historical Information   Past Medical History:   Diagnosis Date   • Arthritis    • Asthma    • BPH with obstruction/lower urinary tract symptoms    • Diabetes mellitus ([facility] [address] )    • Feeling of incomplete bladder emptying    • Foot drop, left    • Hearing loss    • History of back injury    • Microhematuria    • Nocturia    • Sarcoidosis    • Type 2 diabetes mellitus ([facility] [address] )    • Urinary frequency    • Urinary urgency    • Weak urinary stream      Past Surgical History:   Procedure Laterality Date   • CHOLECYSTECTOMY  1982   • KNEE SURGERY Right 1986, 2014   • LUMBAR [address]  2006   • LYMPH NODE BIOPSY  03/2018       Meds/Allergies     (Not in a hospital admission)        Current Outpatient Medications:   •  ACCU-CHEK MATTHEW PLUS test strip, USE  STRIP TO CHECK GLUCOSE THREE TIMES DAILY AS DIRECTED, Disp: , Rfl: 11  •  albuterol (PROVENTIL HFA,VENTOLIN HFA) 90 mcg/act inhaler, Inhale 1 puff as needed, Disp: , Rfl:   •  amLODIPine (NORVASC) 10 mg tablet, daily, Disp: , Rfl:   •  aspirin 81 MG tablet, Take by mouth daily, Disp: , Rfl:   •  benazepril (LOTENSIN) 5 mg tablet, Take 5 mg by mouth daily, Disp: , Rfl:   •  carvedilol (COREG) 25 mg tablet, 2 (two) times a day, Disp: , Rfl:   •  cefadroxil (DURICEF) 500 mg capsule, Take 500 mg by mouth 2 (two) times a day, Disp: , Rfl: 0  •  cephalexin (KEFLEX) 500 mg capsule, 4 (four) times a day, Disp: , Rfl:   •  fluticasone (FLONASE) 50 mcg/act nasal spray, 1 spray into each nostril as needed, Disp: , Rfl:   •  hydrALAZINE (APRESOLINE) 25 mg tablet, 2 (two) times a day, Disp: , Rfl:   •  hydroxychloroquine (PLAQUENIL) 200 mg tablet, 2 (two) times a day, Disp: , Rfl:   •  insulin lispro (HUMALOG KWIKPEN) 100 units/mL injection pen, Inject under the skin daily, Disp: , Rfl:   •  loratadine (CLARITIN) 10 mg tablet, Take 10 mg by mouth daily, Disp: , Rfl:   •  montelukast (SINGULAIR) 10 mg tablet, Take by mouth daily, Disp: , Rfl:   •  Multiple Vitamins-Minerals (DAILY MULTIVITAMIN PO), Take by mouth daily, Disp: , Rfl:   •  oxyCODONE (ROXICODONE) 5 mg immediate release tablet, TAKE 1 TABLET BY MOUTH EVERY 6 HOURS AS NEEDED ON DAY 1 FOR MODERATE TO SEVERE PAIN THEN 1 EVERY 12 HOURS AS NEEDED ON DAY 2 THEN 1 TABLET O, Disp: , Rfl: 0  •  predniSONE 10 mg tablet, daily, Disp: , Rfl:   •  predniSONE 5 mg tablet, daily, Disp: , Rfl:   •  rOPINIRole (REQUIP) 0 5 mg tablet, Take by mouth as needed, Disp: , Rfl:   •  rOPINIRole (REQUIP) 2 mg tablet, Take by mouth as needed, Disp: , Rfl:   •  sildenafil (REVATIO) 20 mg tablet, Take 5 tablets one (1) hour prior to sexual activity  , Disp: 90 tablet, Rfl: 1  •  TOUJEO SOLOSTAR 300 units/mL CONCETRATED U-300 injection pen, daily, Disp: , Rfl:     Allergies   Allergen Reactions   • Medical Tape      Redness     • Metformin Diarrhea   • Silver Sulfadiazine      Redness         Social History   Social History     Substance and Sexual Activity   Alcohol Use No     Social History     Substance and Sexual Activity   Drug Use No     Social History     Tobacco Use   Smoking Status Never   Smokeless Tobacco Never         Family History:   Family History   Problem Relation Age of Onset   • Lung cancer Mother    • Hypertension Father    • Leukemia Father    • Diabetes Sister    • Hypertension Sister    • Prostate cancer Family      Review of Systems - General ROS: negative  Respiratory ROS: negative  Cardiovascular ROS: negative     Objective     Current Vitals:   Blood Pressure: 141/66 (11/30/22 1017)  Pulse: 63 (11/30/22 1017)  Temperature: (!) 97 2 °F (36 2 °C) (11/30/22 1017)  Temp Source: Tympanic (11/30/22 1017)  Respirations: 18 (11/30/22 1017)  Height: 5' 8" (172 7 cm) (11/30/22 1017)  Weight - Scale: 132 kg (290 lb) (11/30/22 1017)  SpO2: 95 % (11/30/22 1017)  No intake or output data in the 24 hours ending 11/30/22 1030    Physical Exam:  General:  Well nourished, no distress  Neuro: Alert and oriented  Eyes:Sclera anicteric, conjunctiva pink  Pulm: Clear to auscultation bilaterally  No respiratory Distress  CV:  Regular rate and rhythm  No murmurs  Abdomen:  Soft, flat, non-tender, without masses or hepatosplenomegaly  Lab Results:       ASSESSMENT:  Julianna Zee is a 71 y o  male for screening, high risk  PLAN:  Colonoscopy  Risks , including, but not limited to, bleeding, perforation, missed lesions, and potential need for surgery, were reviewed  Alternatives to colonoscopy were discussed    Jonny Munoz MD